# Patient Record
Sex: FEMALE | URBAN - METROPOLITAN AREA
[De-identification: names, ages, dates, MRNs, and addresses within clinical notes are randomized per-mention and may not be internally consistent; named-entity substitution may affect disease eponyms.]

---

## 2022-02-11 ENCOUNTER — INPATIENT (INPATIENT)
Facility: HOSPITAL | Age: 82
LOS: 0 days | Discharge: LEFT BEFORE DISCHARGE | End: 2022-02-11
Attending: HOSPITALIST | Admitting: HOSPITALIST
Payer: MEDICARE

## 2022-02-11 VITALS
DIASTOLIC BLOOD PRESSURE: 87 MMHG | RESPIRATION RATE: 16 BRPM | HEART RATE: 74 BPM | SYSTOLIC BLOOD PRESSURE: 155 MMHG | TEMPERATURE: 98 F

## 2022-02-11 VITALS
HEART RATE: 74 BPM | RESPIRATION RATE: 20 BRPM | DIASTOLIC BLOOD PRESSURE: 76 MMHG | SYSTOLIC BLOOD PRESSURE: 158 MMHG | OXYGEN SATURATION: 100 % | TEMPERATURE: 98 F

## 2022-02-11 DIAGNOSIS — N39.0 URINARY TRACT INFECTION, SITE NOT SPECIFIED: ICD-10-CM

## 2022-02-11 DIAGNOSIS — F32.9 MAJOR DEPRESSIVE DISORDER, SINGLE EPISODE, UNSPECIFIED: ICD-10-CM
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
## 2022-02-11 DIAGNOSIS — Z98.891 HISTORY OF UTERINE SCAR FROM PREVIOUS SURGERY: Chronic | ICD-10-CM

## 2022-02-11 DIAGNOSIS — I10 ESSENTIAL (PRIMARY) HYPERTENSION: ICD-10-CM

## 2022-02-11 DIAGNOSIS — E78.5 HYPERLIPIDEMIA, UNSPECIFIED: ICD-10-CM

## 2022-02-11 DIAGNOSIS — J44.9 CHRONIC OBSTRUCTIVE PULMONARY DISEASE, UNSPECIFIED: ICD-10-CM

## 2022-02-11 DIAGNOSIS — Z29.9 ENCOUNTER FOR PROPHYLACTIC MEASURES, UNSPECIFIED: ICD-10-CM

## 2022-02-11 LAB
ALBUMIN SERPL ELPH-MCNC: 3.7 G/DL — SIGNIFICANT CHANGE UP (ref 3.3–5)
ALP SERPL-CCNC: 82 U/L — SIGNIFICANT CHANGE UP (ref 40–120)
ALT FLD-CCNC: 7 U/L — SIGNIFICANT CHANGE UP (ref 4–33)
ANION GAP SERPL CALC-SCNC: 8 MMOL/L — SIGNIFICANT CHANGE UP (ref 7–14)
APPEARANCE UR: ABNORMAL
APTT BLD: 30.5 SEC — SIGNIFICANT CHANGE UP (ref 27–36.3)
AST SERPL-CCNC: 14 U/L — SIGNIFICANT CHANGE UP (ref 4–32)
BACTERIA # UR AUTO: ABNORMAL
BASOPHILS # BLD AUTO: 0.03 K/UL — SIGNIFICANT CHANGE UP (ref 0–0.2)
BASOPHILS NFR BLD AUTO: 0.5 % — SIGNIFICANT CHANGE UP (ref 0–2)
BILIRUB SERPL-MCNC: 0.4 MG/DL — SIGNIFICANT CHANGE UP (ref 0.2–1.2)
BILIRUB UR-MCNC: NEGATIVE — SIGNIFICANT CHANGE UP
BLD GP AB SCN SERPL QL: POSITIVE — SIGNIFICANT CHANGE UP
BUN SERPL-MCNC: 13 MG/DL — SIGNIFICANT CHANGE UP (ref 7–23)
CALCIUM SERPL-MCNC: 10.1 MG/DL — SIGNIFICANT CHANGE UP (ref 8.4–10.5)
CHLORIDE SERPL-SCNC: 103 MMOL/L — SIGNIFICANT CHANGE UP (ref 98–107)
CO2 SERPL-SCNC: 27 MMOL/L — SIGNIFICANT CHANGE UP (ref 22–31)
COLOR SPEC: SIGNIFICANT CHANGE UP
CREAT SERPL-MCNC: 0.65 MG/DL — SIGNIFICANT CHANGE UP (ref 0.5–1.3)
DIFF PNL FLD: NEGATIVE — SIGNIFICANT CHANGE UP
EOSINOPHIL # BLD AUTO: 0.11 K/UL — SIGNIFICANT CHANGE UP (ref 0–0.5)
EOSINOPHIL NFR BLD AUTO: 2 % — SIGNIFICANT CHANGE UP (ref 0–6)
EPI CELLS # UR: 2 /HPF — SIGNIFICANT CHANGE UP (ref 0–5)
GLUCOSE SERPL-MCNC: 112 MG/DL — HIGH (ref 70–99)
GLUCOSE UR QL: NEGATIVE — SIGNIFICANT CHANGE UP
HCT VFR BLD CALC: 34.4 % — LOW (ref 34.5–45)
HGB BLD-MCNC: 11.2 G/DL — LOW (ref 11.5–15.5)
HYALINE CASTS # UR AUTO: 2 /LPF — SIGNIFICANT CHANGE UP (ref 0–7)
IANC: 3.72 K/UL — SIGNIFICANT CHANGE UP (ref 1.5–8.5)
IMM GRANULOCYTES NFR BLD AUTO: 1.1 % — SIGNIFICANT CHANGE UP (ref 0–1.5)
INR BLD: 1.05 RATIO — SIGNIFICANT CHANGE UP (ref 0.88–1.16)
KETONES UR-MCNC: NEGATIVE — SIGNIFICANT CHANGE UP
LEUKOCYTE ESTERASE UR-ACNC: ABNORMAL
LYMPHOCYTES # BLD AUTO: 1.39 K/UL — SIGNIFICANT CHANGE UP (ref 1–3.3)
LYMPHOCYTES # BLD AUTO: 24.7 % — SIGNIFICANT CHANGE UP (ref 13–44)
MCHC RBC-ENTMCNC: 29.6 PG — SIGNIFICANT CHANGE UP (ref 27–34)
MCHC RBC-ENTMCNC: 32.6 GM/DL — SIGNIFICANT CHANGE UP (ref 32–36)
MCV RBC AUTO: 90.8 FL — SIGNIFICANT CHANGE UP (ref 80–100)
MONOCYTES # BLD AUTO: 0.32 K/UL — SIGNIFICANT CHANGE UP (ref 0–0.9)
MONOCYTES NFR BLD AUTO: 5.7 % — SIGNIFICANT CHANGE UP (ref 2–14)
NEUTROPHILS # BLD AUTO: 3.72 K/UL — SIGNIFICANT CHANGE UP (ref 1.8–7.4)
NEUTROPHILS NFR BLD AUTO: 66 % — SIGNIFICANT CHANGE UP (ref 43–77)
NITRITE UR-MCNC: POSITIVE
NRBC # BLD: 0 /100 WBCS — SIGNIFICANT CHANGE UP
NRBC # FLD: 0 K/UL — SIGNIFICANT CHANGE UP
PH UR: 6 — SIGNIFICANT CHANGE UP (ref 5–8)
PLATELET # BLD AUTO: 114 K/UL — LOW (ref 150–400)
POTASSIUM SERPL-MCNC: 4.1 MMOL/L — SIGNIFICANT CHANGE UP (ref 3.5–5.3)
POTASSIUM SERPL-SCNC: 4.1 MMOL/L — SIGNIFICANT CHANGE UP (ref 3.5–5.3)
PROT SERPL-MCNC: 6.5 G/DL — SIGNIFICANT CHANGE UP (ref 6–8.3)
PROT UR-MCNC: ABNORMAL
PROTHROM AB SERPL-ACNC: 12.1 SEC — SIGNIFICANT CHANGE UP (ref 10.6–13.6)
RBC # BLD: 3.79 M/UL — LOW (ref 3.8–5.2)
RBC # FLD: 12.8 % — SIGNIFICANT CHANGE UP (ref 10.3–14.5)
RBC CASTS # UR COMP ASSIST: 5 /HPF — HIGH (ref 0–4)
SARS-COV-2 RNA SPEC QL NAA+PROBE: SIGNIFICANT CHANGE UP
SODIUM SERPL-SCNC: 138 MMOL/L — SIGNIFICANT CHANGE UP (ref 135–145)
SP GR SPEC: 1.02 — SIGNIFICANT CHANGE UP (ref 1–1.05)
TSH SERPL-MCNC: 2.92 UIU/ML — SIGNIFICANT CHANGE UP (ref 0.27–4.2)
UROBILINOGEN FLD QL: SIGNIFICANT CHANGE UP
WBC # BLD: 5.63 K/UL — SIGNIFICANT CHANGE UP (ref 3.8–10.5)
WBC # FLD AUTO: 5.63 K/UL — SIGNIFICANT CHANGE UP (ref 3.8–10.5)
WBC UR QL: 66 /HPF — HIGH (ref 0–5)

## 2022-02-11 PROCEDURE — 99223 1ST HOSP IP/OBS HIGH 75: CPT

## 2022-02-11 PROCEDURE — 72125 CT NECK SPINE W/O DYE: CPT | Mod: 26,MA

## 2022-02-11 PROCEDURE — 73562 X-RAY EXAM OF KNEE 3: CPT | Mod: 26,LT

## 2022-02-11 PROCEDURE — 99285 EMERGENCY DEPT VISIT HI MDM: CPT

## 2022-02-11 PROCEDURE — 73552 X-RAY EXAM OF FEMUR 2/>: CPT | Mod: 26,LT

## 2022-02-11 PROCEDURE — 73502 X-RAY EXAM HIP UNI 2-3 VIEWS: CPT | Mod: 26,LT

## 2022-02-11 PROCEDURE — 72131 CT LUMBAR SPINE W/O DYE: CPT | Mod: 26,MA

## 2022-02-11 PROCEDURE — 86077 PHYS BLOOD BANK SERV XMATCH: CPT

## 2022-02-11 PROCEDURE — 72128 CT CHEST SPINE W/O DYE: CPT | Mod: 26,MA

## 2022-02-11 PROCEDURE — 70450 CT HEAD/BRAIN W/O DYE: CPT | Mod: 26,MA

## 2022-02-11 RX ORDER — ATORVASTATIN CALCIUM 80 MG/1
80 TABLET, FILM COATED ORAL AT BEDTIME
Refills: 0 | Status: DISCONTINUED | OUTPATIENT
Start: 2022-02-11 | End: 2022-02-11

## 2022-02-11 RX ORDER — CEFTRIAXONE 500 MG/1
1000 INJECTION, POWDER, FOR SOLUTION INTRAMUSCULAR; INTRAVENOUS EVERY 24 HOURS
Refills: 0 | Status: DISCONTINUED | OUTPATIENT
Start: 2022-02-11 | End: 2022-02-11

## 2022-02-11 RX ORDER — ENOXAPARIN SODIUM 100 MG/ML
40 INJECTION SUBCUTANEOUS DAILY
Refills: 0 | Status: DISCONTINUED | OUTPATIENT
Start: 2022-02-11 | End: 2022-02-11

## 2022-02-11 RX ORDER — AMITRIPTYLINE HCL 25 MG
25 TABLET ORAL AT BEDTIME
Refills: 0 | Status: DISCONTINUED | OUTPATIENT
Start: 2022-02-11 | End: 2022-02-11

## 2022-02-11 RX ORDER — CEFTRIAXONE 500 MG/1
1000 INJECTION, POWDER, FOR SOLUTION INTRAMUSCULAR; INTRAVENOUS ONCE
Refills: 0 | Status: COMPLETED | OUTPATIENT
Start: 2022-02-11 | End: 2022-02-11

## 2022-02-11 RX ORDER — ACETAMINOPHEN 500 MG
975 TABLET ORAL ONCE
Refills: 0 | Status: COMPLETED | OUTPATIENT
Start: 2022-02-11 | End: 2022-02-11

## 2022-02-11 RX ORDER — PANTOPRAZOLE SODIUM 20 MG/1
40 TABLET, DELAYED RELEASE ORAL
Refills: 0 | Status: DISCONTINUED | OUTPATIENT
Start: 2022-02-11 | End: 2022-02-11

## 2022-02-11 RX ORDER — LOSARTAN POTASSIUM 100 MG/1
25 TABLET, FILM COATED ORAL DAILY
Refills: 0 | Status: DISCONTINUED | OUTPATIENT
Start: 2022-02-11 | End: 2022-02-11

## 2022-02-11 RX ORDER — AMLODIPINE BESYLATE 2.5 MG/1
2.5 TABLET ORAL DAILY
Refills: 0 | Status: DISCONTINUED | OUTPATIENT
Start: 2022-02-11 | End: 2022-02-11

## 2022-02-11 RX ORDER — ALBUTEROL 90 UG/1
2 AEROSOL, METERED ORAL EVERY 6 HOURS
Refills: 0 | Status: DISCONTINUED | OUTPATIENT
Start: 2022-02-11 | End: 2022-02-11

## 2022-02-11 RX ORDER — ACETAMINOPHEN 500 MG
650 TABLET ORAL EVERY 6 HOURS
Refills: 0 | Status: DISCONTINUED | OUTPATIENT
Start: 2022-02-11 | End: 2022-02-11

## 2022-02-11 RX ORDER — BUDESONIDE AND FORMOTEROL FUMARATE DIHYDRATE 160; 4.5 UG/1; UG/1
2 AEROSOL RESPIRATORY (INHALATION)
Refills: 0 | Status: DISCONTINUED | OUTPATIENT
Start: 2022-02-11 | End: 2022-02-11

## 2022-02-11 RX ORDER — ACETAMINOPHEN 500 MG
2 TABLET ORAL
Qty: 0 | Refills: 0 | DISCHARGE
Start: 2022-02-11

## 2022-02-11 RX ADMIN — CEFTRIAXONE 100 MILLIGRAM(S): 500 INJECTION, POWDER, FOR SOLUTION INTRAMUSCULAR; INTRAVENOUS at 12:49

## 2022-02-11 RX ADMIN — Medication 975 MILLIGRAM(S): at 11:22

## 2022-02-11 RX ADMIN — Medication 975 MILLIGRAM(S): at 12:54

## 2022-02-11 NOTE — H&P ADULT - NSHPPHYSICALEXAM_GEN_ALL_CORE
Vital Signs Last 24 Hrs  T(C): 36.6 (11 Feb 2022 15:42), Max: 36.6 (11 Feb 2022 09:20)  T(F): 97.9 (11 Feb 2022 15:42), Max: 97.9 (11 Feb 2022 12:54)  HR: 66 (11 Feb 2022 15:42) (65 - 74)  BP: 161/95 (11 Feb 2022 15:42) (155/77 - 175/88)  BP(mean): --  RR: 16 (11 Feb 2022 15:42) (16 - 17)  SpO2: 98% (11 Feb 2022 15:42) (98% - 99%)

## 2022-02-11 NOTE — DISCHARGE NOTE PROVIDER - CARE PROVIDER_API CALL
Dr. Rm Sampson,   Primary Care Doctor  [address] [place] [address]  Phone: [phone]  Fax: (   )    -  Follow Up Time:

## 2022-02-11 NOTE — H&P ADULT - ASSESSMENT
80 y/o female, with a PmHx of Vertigo, Depression, COPD, Anemia, HTN, HLD, presented from home to the [facility] ED after having a vertigo causing her to fall down the stairs. Admitted to medicine for social admission and for PT eval.

## 2022-02-11 NOTE — DISCHARGE NOTE PROVIDER - PROVIDER TOKENS
FREE:[LAST:[Dr. Rm Sampson],PHONE:[phone]],FAX:[(   )    -],ADDRESS:[Primary Care Doctor  [address] [place] [address]]]

## 2022-02-11 NOTE — ED PROVIDER NOTE - OBJECTIVE STATEMENT
82 yo F with history of HTN, HLD, Anemia, COPD, vertigo, ?irregular heartbeat coming in after mechanical fall this morning on the stairs. Patient normally ambulates with walker and has aid, was using the stairs then had dizziness, vertigo spell and lost her footing and fell down the stairs. did not lose consciousness, no head trauma, was not able to walk after fall. brought to the ED. At this time patient has lower neck pain, spinal pain, left hip pain, left knee pain. Patient has stress incontinence at baseline however has not lost control of bladder/bowels post fall. has intact saddle sensation. no fevers/chills/chest pain/SOB(different than baseline),N/V/Abdominal pain/ dysuria/diarrhea/leg swelling.     PCP Dr. Rm Sampson

## 2022-02-11 NOTE — H&P ADULT - NEGATIVE MUSCULOSKELETAL SYMPTOMS
Patient Education        Learning About Diabetes Food Guidelines  Your Care Instructions    Meal planning is important to manage diabetes. It helps keep your blood sugar at a target level (which you set with your doctor). You don't have to eat special foods. You can eat what your family eats, including sweets once in a while. But you do have to pay attention to how often you eat and how much you eat of certain foods. You may want to work with a dietitian or a certified diabetes educator (CDE) to help you plan meals and snacks. A dietitian or CDE can also help you lose weight if that is one of your goals. What should you know about eating carbs? Managing the amount of carbohydrate (carbs) you eat is an important part of healthy meals when you have diabetes. Carbohydrate is found in many foods. · Learn which foods have carbs. And learn the amounts of carbs in different foods. ¨ Bread, cereal, pasta, and rice have about 15 grams of carbs in a serving. A serving is 1 slice of bread (1 ounce), ½ cup of cooked cereal, or 1/3 cup of cooked pasta or rice. ¨ Fruits have 15 grams of carbs in a serving. A serving is 1 small fresh fruit, such as an apple or orange; ½ of a banana; ½ cup of cooked or canned fruit; ½ cup of fruit juice; 1 cup of melon or raspberries; or 2 tablespoons of dried fruit. ¨ Milk and no-sugar-added yogurt have 15 grams of carbs in a serving. A serving is 1 cup of milk or 2/3 cup of no-sugar-added yogurt. ¨ Starchy vegetables have 15 grams of carbs in a serving. A serving is ½ cup of mashed potatoes or sweet potato; 1 cup winter squash; ½ of a small baked potato; ½ cup of cooked beans; or ½ cup cooked corn or green peas. · Learn how much carbs to eat each day and at each meal. A dietitian or CDE can teach you how to keep track of the amount of carbs you eat. This is called carbohydrate counting. · If you are not sure how to count carbohydrate grams, use the Plate Method to plan meals.  It is a healthy weight if that is one of your goals. What plan is right for you? Your dietitian or diabetes educator may suggest that you start with the plate format or carbohydrate counting. The plate format  The plate format is a simple way to help you manage how you eat. You plan meals by learning how much space each food should take on a plate. Using the plate format helps you spread carbohydrate throughout the day. It can make it easier to keep your blood sugar level within your target range. It also helps you see if you're eating healthy portion sizes. To use the plate format, you put non-starchy vegetables on half your plate. Add meat or meat substitutes on one-quarter of the plate. Put a grain or starchy vegetable (such as brown rice or a potato) on the final quarter of the plate. You can add a small piece of fruit and some low-fat or fat-free milk or yogurt, depending on your carbohydrate goal for each meal.  Here are some tips for using the plate format:  · Make sure that you are not using an oversized plate. A 9-inch plate is best. Many restaurants use larger plates. · Get used to using the plate format at home. Then you can use it when you eat out. · Write down your questions about using the plate format. Talk to your doctor, a dietitian, or a diabetes educator about your concerns. Carbohydrate counting  With carbohydrate counting, you plan meals based on the amount of carbohydrate in each food. Carbohydrate raises blood sugar higher and more quickly than any other nutrient. It is found in desserts, breads and cereals, and fruit. It's also found in starchy vegetables such as potatoes and corn, grains such as rice and pasta, and milk and yogurt. Spreading carbohydrate throughout the day helps keep your blood sugar levels within your target range.   Your daily amount depends on several things, including your weight, how active you are, which diabetes medicines you take, and what your goals are for your blood too.  · Use cookbooks or online recipes to plan several main meals. Plan some quick meals for busy nights. You also can double some recipes that freeze well. Then you can save half for other busy nights when you don't have time to cook. · Make sure you have the ingredients you need for your recipes. If you're running low on basic items, put these items on your shopping list too. · List foods that you use to make breakfasts, lunches, and snacks. List plenty of fruits and vegetables. · Post this list on the refrigerator. Add to it as you think of more things you need. · Take the list to the store to do your weekly shopping. Follow-up care is a key part of your treatment and safety. Be sure to make and go to all appointments, and call your doctor if you are having problems. It's also a good idea to know your test results and keep a list of the medicines you take. Where can you learn more? Go to [email or web address]://[email or web address]. org and sign in to your [employer] account. Enter P424 in the medidametrics box to learn more about \"Learning About Meal Planning for Diabetes. \"     If you do not have an account, please click on the \"Sign Up Now\" link. Current as of: March 13, 2017  Content Version: 11.5  © 1868-4795 Healthwise, Incorporated. Care instructions adapted under license by [facility] ([facility]). If you have questions about a medical condition or this instruction, always ask your healthcare professional. [place] [address] any warranty or liability for your use of this information. Patient Education        Learning About Type 2 Diabetes  What is type 2 diabetes? Insulin is a hormone that helps your body use sugar from your food as energy. Type 2 diabetes happens when your body can't use insulin the right way. Over time, the pancreas can't make enough insulin. If you don't have enough insulin, too much sugar stays in your blood.   If you are overweight, get little or no exercise, or weight. · Exercising regularly. · Eating healthy foods. How is type 2 diabetes treated? If you have type 2 diabetes, here are the most important things you can do. · Take your diabetes medicines. · Check your blood sugar as often as your doctor recommends. Also, get a hemoglobin A1c test at least every 6 months. · Try to eat a variety of foods and to spread carbohydrate throughout the day. Carbohydrate raises blood sugar higher and more quickly than any other nutrient does. Carbohydrate is found in sugar, breads and cereals, fruit, starchy vegetables such as potatoes and corn, and milk and yogurt. · Get at least 30 minutes of exercise on most days of the week. Walking is a good choice. You also may want to do other activities, such as running, swimming, cycling, or playing tennis or team sports. If your doctor says it's okay, do muscle-strengthening exercises at least 2 times a week. · See your doctor for checkups and tests on a regular schedule. · If you have high blood pressure or high cholesterol, take the medicines as prescribed by your doctor. · Do not smoke. Smoking can make health problems worse. This includes problems you might have with type 2 diabetes. If you need help quitting, talk to your doctor about stop-smoking programs and medicines. These can increase your chances of quitting for good. Follow-up care is a key part of your treatment and safety. Be sure to make and go to all appointments, and call your doctor if you are having problems. It's also a good idea to know your test results and keep a list of the medicines you take. Where can you learn more? Go to https://alexi.[employer]. org and sign in to your [employer] account. Enter E251 in the [id number] box to learn more about \"Learning About Type 2 Diabetes. \"     If you do not have an account, please click on the \"Sign Up Now\" link.   Current as of: March 13, 2017  Content Version: 11.5  © 8562-9689 Healthwise, Incorporated. Care instructions adapted under license by [facility] ([facility]). If you have questions about a medical condition or this instruction, always ask your healthcare professional. [address] any warranty or liability for your use of this information. no stiffness/no neck pain

## 2022-02-11 NOTE — ED PROVIDER NOTE - CLINICAL SUMMARY MEDICAL DECISION MAKING FREE TEXT BOX
82 yo F with history of HTN, HLD, Anemia, COPD, vertigo, ?irregular heartbeat coming in after mechanical fall this morning on the stairs pending labs and imaging workup 82 yo F with history of HTN, HLD, Anemia, COPD, vertigo, ?irregular heartbeat coming in after mechanical fall this morning on the stairs wth no fractures o gross lab abnormalities identified and admitted for fall and pain workup.

## 2022-02-11 NOTE — ED PROVIDER NOTE - NS ED ROS FT
Review of Systems:  Constitutional: No fever, No weight loss, good appetite/po intake  Head: No headache   Eyes: No blurry vision, No diplopia  Neuro: No tremors, No muscle weakness   Cardiovascular: No chest pain, No palpitations  Respiratory: No SOB, No cough  GI: No nausea, No vomiting, No diarrhea  : No dysuria, No hematuria  Skin: No rash  MSK: has joint pain  Psych: No depression

## 2022-02-11 NOTE — H&P ADULT - NSICDXPASTMEDICALHX_GEN_ALL_CORE_FT
PAST MEDICAL HISTORY:  COPD without exacerbation     HLD (hyperlipidemia)     HTN (hypertension)     Major depression     Vertigo

## 2022-02-11 NOTE — DISCHARGE NOTE NURSING/CASE MANAGEMENT/SOCIAL WORK - PATIENT PORTAL LINK FT
You can access the FollowMyHealth Patient Portal offered by [facility] by registering at the following website: [email or web address]. By joining [employer]’s FollowMyHealth portal, you will also be able to view your health information using other applications (apps) compatible with our system.

## 2022-02-11 NOTE — H&P ADULT - ATTENDING COMMENTS
81 F with long standing vertigo who p/w mechanical fall  imaging neg for fractures or bleed  no LOC  CTH with chronic infarcts - neurologically intact  asymptomatic bacteruria - would not tx UA  pt has HHA via insurance - lives on 2nd floor with son living on first  pt adamant about leaving - although no acute medical issues, she hasn't ambulated yet and would need to be seen by PT for safe dispo - also would need to have HHA reinstated - I explained in detail these concerns with pt - pt has capacity and understands and verbalizes back risk of fall/trauma especially with lack of HHA availability -  dw son  should pt decide to leave, needs to be AMA    dw ACP Mark 81 F with long standing vertigo who p/w mechanical fall  imaging neg for fractures or bleed  no LOC  CTH with chronic infarcts - neurologically intact  asymptomatic bacteruria - would not tx UA  pt has HHA via insurance - lives on 2nd floor with son living on first  pt adamant about leaving - although no acute medical issues, she hasn't ambulated yet and would need to be seen by PT for safe dispo - also would need to have HHA reinstated - I explained in detail these concerns with pt - pt has capacity and understands and verbalizes back risk of fall/trauma especially with lack of HHA availability -  ED dw son extensively   should pt decide to leave, needs to be AMA    dw ACP Mark

## 2022-02-11 NOTE — ED PROVIDER NOTE - CARE PLAN
Assessment and plan of treatment:	82 yo F with history of HTN, HLD, Anemia, COPD, vertigo, ?irregular heartbeat coming in after mechanical fall this morning on the stairs pending imaging and standard lab work soon.   Principal Discharge DX:	Fall  Assessment and plan of treatment:	82 yo F with history of HTN, HLD, Anemia, COPD, vertigo, ?irregular heartbeat coming in after mechanical fall this morning on the stairs pending imaging and standard lab work soon.   1

## 2022-02-11 NOTE — ED PROVIDER NOTE - PLAN OF CARE
82 yo F with history of HTN, HLD, Anemia, COPD, vertigo, ?irregular heartbeat coming in after mechanical fall this morning on the stairs pending imaging and standard lab work soon.

## 2022-02-11 NOTE — ED PROVIDER NOTE - PHYSICAL EXAMINATION
PHYSICAL EXAM  GENERAL: NAD,   HEAD:  Atraumatic, Normocephalic  EYES: EOMI b/l, PERRLA b/l, conjunctiva and sclera clear  NECK: Supple, No JVD, No LAD,  CHEST/LUNG: Clear to auscultation bilaterally; No wheeze or ronchi  HEART: Regular rate and rhythm; S1 and S2 present, No murmurs, rubs, or gallops  ABDOMEN: Soft, Nontender, Nondistended; Bowel sounds present  EXTREMITIES:  2+ Peripheral Pulses, No clubbing, cyanosis, or edema  NEURO: AAOx3, non-focal, intact saddle sensation, all exretemities with strength and sensation  SKIN: No rashes or lesions  has lower C spine tenderness, some spine tenderness, has left hip TTP, left knee TTP

## 2022-02-11 NOTE — H&P ADULT - HISTORY OF PRESENT ILLNESS
82 y/o female, with a PmHx of Vertigo, Depression, COPD, Anemia, HTN, HLD, presented from home to the [facility] ED after having a vertigo causing her to fall down the stairs. Pt states at around 0730hrs this morning she was walking down her stairs to let the home health in (has a HHA for about 5 hours per day x 7 days) when she became dizzy (chronic vertigo) causing her to fall down about 5 stairs. Pt states she fell on her butt and then slid down the stairs. Denies any LOC, HA, dizziness, blurred vision, abd pain, n/v. Pt states the home health then called 911. She was c/o of left hip and left knee pain. Pt states she normally walks with a walker. In the [facility] ED, she had a CT Head/Cervical/Thoracic/Lumbar spine that were negative for acute fractures. She had a C-collar on and was cleared by the ED (cervical collar taken off). She also had xrays of her left pelvis/hip/knee and femur that were negative. Currently denies any pain. She was also incidentally found to have a UTI and was given 1 dose of Ceftriaxone but denies any fever, burning on urination or any pelvic pain. She is now being admitted to medicine for further medical evaluation and treatment.    => Of Note: Pt's  recently passed away on 1/9/22; she lives alone but her son and daughter in law live downstairs (mother and daughter)

## 2022-02-11 NOTE — ED PROVIDER NOTE - PROGRESS NOTE DETAILS
spoke to son Korey  updated on course  U A positive for UTI started ceftriaxone still with minimal pain, no acute fractures identified, admitted under Dr. Shahid spoke to patient as she does not want to stay at the hospital much despite her being in continued pain and being unable to walk. Spoke to son over the phone who will talk to his sister and the patient and come to a decision.

## 2022-02-11 NOTE — ED ADULT NURSE NOTE - OBJECTIVE STATEMENT
Pt received to room 20 presents s/p fall down 5 stairs landing on back and hitting head. Pt denies LOC, denies blood thinner use. Pt reports she felt "dizzy" prior to fall, currently endorsing dizziness. Pt a&ox3, ambulatory with walker at baseline, skin intact, respirations even and unlabored. pt denies CP, blurry vision or any other symptoms. pt endorsing hx of COPD, dislocated left hip, CHF. Pt NSR on CM, Resident and MD Hearn at bedside assessing pt. Will await orders and continue to monitor.

## 2022-02-11 NOTE — H&P ADULT - PROBLEM SELECTOR PLAN 2
UA - large leuks, pos nitrites, many bacteria  - no dysuria  - will hold off on abx for now  - UCX testing  - no WBC

## 2022-02-11 NOTE — ED PROVIDER NOTE - NSICDXPASTMEDICALHX_GEN_ALL_CORE_FT
25-Jul-2018 12:56
25-Jul-2018 13:00
26-Jul-2018 15:11
PAST MEDICAL HISTORY:  COPD without exacerbation     HLD (hyperlipidemia)     HTN (hypertension)     Vertigo

## 2022-02-11 NOTE — DISCHARGE NOTE PROVIDER - NSDCMRMEDTOKEN_GEN_ALL_CORE_FT
acetaminophen 325 mg oral tablet: 2 tab(s) orally every 6 hours, As needed, Temp greater or equal to 38C (100.4F), Mild Pain (1 - 3), Moderate Pain (4 - 6)  Albuterol (Eqv-ProAir HFA) 90 mcg/inh inhalation aerosol: 2 puff(s) inhaled every 6 hours, As Needed  amitriptyline 25 mg oral tablet: 1 tab(s) orally once a day (at bedtime)  amLODIPine 2.5 mg oral tablet: 1 tab(s) orally once a day  atorvastatin 80 mg oral tablet: 1 tab(s) orally once a day  losartan 25 mg oral tablet: 1 tab(s) orally once a day  Protonix 40 mg oral delayed release tablet: 1 tab(s) orally once a day  Symbicort 160 mcg-4.5 mcg/inh inhalation aerosol: 2 puff(s) inhaled 2 times a day

## 2022-02-11 NOTE — H&P ADULT - PROBLEM SELECTOR PLAN 1
COVID neg            2/11 CT Head/Cervical/Thoracic/Lumbar Spine  - CT brain: No acute intracranial hemorrhage, brain edema, or mass effect. Chronic bilateral basal ganglia infarcts. No displaced calvarial fracture. CT cervical spine: No acute fracture or traumatic subluxation. No prevertebral soft tissue swelling. Degenerative changes. CT thoracolumbar spine: No acute fracture or traumatic subluxation. Vertebral body height and facet alignment are maintained. Degenerative changes in the lumbar region with significant spinal canal stenosis at L3-L4 and L4-L5.  2/11 Left Femur/Left Knee/Left Hip/Pelvis Xray - No acute fracture or dislocation. Severe degenerative arthritis of the left knee.  - pain control

## 2022-02-11 NOTE — DISCHARGE NOTE PROVIDER - NSDCFUADDAPPT_GEN_ALL_CORE_FT
Follow up with your Primary Care Doctor. You are leaving the hospital against medical advice. You will need to have your home health aides re-established.

## 2022-02-11 NOTE — DISCHARGE NOTE PROVIDER - HOSPITAL COURSE
80 y/o female, with a PmHx of Vertigo, Depression, COPD, Anemia, HTN, HLD, presented from home to the [facility] ED after having a vertigo causing her to fall down the stairs. Pt states at around 0730hrs this morning she was walking down her stairs to let the home health in (has a HHA for about 5 hours per day x 7 days) when she became dizzy (chronic vertigo) causing her to fall down about 5 stairs. Pt states she fell on her butt and then slid down the stairs. Denies any LOC, HA, dizziness, blurred vision, abd pain, n/v. Pt states the home health then called 911. She was c/o of left hip and left knee pain. Pt states she normally walks with a walker. In the [facility] ED, she had a CT Head/Cervical/Thoracic/Lumbar spine that were negative for acute fractures. She had a C-collar on and was cleared by the ED (cervical collar taken off). She also had xrays of her left pelvis/hip/knee and femur that were negative. Currently denies any pain. She was also incidentally found to have a UTI and was given 1 dose of Ceftriaxone but denies any fever, burning on urination or any pelvic pain. She is now being admitted to medicine for further medical evaluation and treatment.    On admission:    1. Fall  COVID neg            2/11 CT Head/Cervical/Thoracic/Lumbar Spine  - CT brain: No acute intracranial hemorrhage, brain edema, or mass effect. Chronic bilateral basal ganglia infarcts. No displaced calvarial fracture. CT cervical spine: No acute fracture or traumatic subluxation. No prevertebral soft tissue swelling. Degenerative changes. CT thoracolumbar spine: No acute fracture or traumatic subluxation. Vertebral body height and facet alignment are maintained. Degenerative changes in the lumbar region with significant spinal canal stenosis at L3-L4 and L4-L5.  2/11 Left Femur/Left Knee/Left Hip/Pelvis Xray - No acute fracture or dislocation. Severe degenerative arthritis of the left knee.  - pain control    2. UTI  UA - large leuks, pos nitrites, many bacteria  - no dysuria  - will hold off on abx for now    3. Vertigo  - chronic, not on meclizine    4. HTN  - monitor bp, cont meds, adjust as needed    5. HLD  - fasting lipid profile, cont statin    6. Depression  - cont Amitriptyline    7. COPD  - non-smoke,  was a smoker  - cont Symbicort and albuterol prn    Pt appears comfortable at this time and is now leaving the hospital Against Medical Advice. Called by nurse to evaluate patient who wishes to leave the hospital against medical advice. Patient is A&O x3 and has full capacity to make his or her own medical decisions. Pt was informed of their medical conditions, benefits, and alternatives to treatment as well as the risks of refusing treatment and the seriousness of leaving against medical advice such as the risks of heart attack, stroke, seizure, and even death were fully explained to the patient.  After expressing full understanding, patient then signs out against medical advice witnessed by the nurse.  Attending made aware.     Spoke to Pt's son Korey, who will come pick her up [phone].

## 2022-02-11 NOTE — ED PROVIDER NOTE - ATTENDING CONTRIBUTION TO CARE
I have personally performed a face to face medical and diagnostic evaluation of the patient. I have discussed with and reviewed the Resident's note and agree with the History, ROS, Physical Exam and MDM unless otherwise indicated. A brief summary of my personal evaluation and impression can be found below.    81F hx of HTN HLD anemia COPD vertigo presents with a cc of back pain and L hip pain s/p non syncopal mechanical fall this morning, no head strike no LOC recalls entire event. Denies numbness, tingling or loss of sensation. Denies loss of motor function. Denies changes in vision. No saddle anaesthesia. Has not been able to walk since fall. No CP SOB or MATA prior to fall, she does report she was feeling dizzy prior to the fall but this dizziness is c/w her prior episodes of dizziness, no other acute component.     All other ROS negative, except as above and as per HPI and ROS section.    VITALS: Initial triage and subsequent vitals have been reviewed by me.  GEN APPEARANCE: WDWN, alert, non-toxic, NAD  HEAD: Atraumatic.  EYES: PERRLa, EOMI, vision grossly intact.   NECK: Supple  CV: RRR, S1S2, no c/r/m/g. Cap refill <2 seconds. No bruits.   LUNGS: CTAB. No abnormal breath sounds.  ABDOMEN: Soft, NTND. No guarding or rebound.   MSK/EXT: C/T/L diffuse ttp+ No CVA ttp. Pelvis stable. L hip, L knee ttp, DNVI. No peripheral edema.  NEURO: Alert, follows commands. Weight bearing normal. Speech normal. Sensation and motor normal x4 extremities. No saddle anesthesia. CN2-12 normal, coordination normal, ambulating normally.  SKIN: Warm, dry and intact. No rash.  PSYCH: Appropriate    Plan/MDM: 81F hx of HTN HLD anemia COPD vertigo presents with a cc of back pain and L hip pain s/p non syncopal mechanical fall this morning, no head strike no LOC recalls entire event. Denies numbness, tingling or loss of sensation. Denies loss of motor function. Denies changes in vision. No saddle anaesthesia. Has not been able to walk since fall. exam vss non toxic PE as above ddx c/ft traumatic injury 2/2 fall will check labs ct xr LE meds as needed and reassess.

## 2022-02-11 NOTE — ED ADULT NURSE REASSESSMENT NOTE - NS ED NURSE REASSESS COMMENT FT1
[facility]  in [facility], being transported over.

## 2022-02-11 NOTE — CHART NOTE - NSCHARTNOTEFT_GEN_A_CORE
Medicine PA Note    Spoke with Dimitris the ED . Case Management had left for the day and patient is leaving the hospital against medical advice. Will be unable to re-establish home health aide. Spoke to son Korey via telephone and he is aware that she is leaving against medical advice and that they will need to re-establish the HHA. Son is coming to the hospital to pick her up.    Mark Salas PA-C  x [phone]

## 2022-02-11 NOTE — DISCHARGE NOTE NURSING/CASE MANAGEMENT/SOCIAL WORK - NSDCPEFALRISK_GEN_ALL_CORE
For information on Fall & Injury Prevention, visit: [email or web address] OR  [email or web address] OR  https://www.cdc.gov/steadi/patient.html

## 2022-02-11 NOTE — DISCHARGE NOTE PROVIDER - NSDCCPCAREPLAN_GEN_ALL_CORE_FT
PRINCIPAL DISCHARGE DIAGNOSIS  Diagnosis: Fall  Assessment and Plan of Treatment: Follow up with your primary care doctor for the Chronic Vertigo that had caused your fall. Take Tylenol as needed for pain control.      SECONDARY DISCHARGE DIAGNOSES  Diagnosis: UTI (urinary tract infection)  Assessment and Plan of Treatment: To be asymptomatic and to prevent reoccurrence.   Always wipe from front to back after using the bathroom. Never wipe twice with the same tissue. Take showers and avoid prolonged baths. Try to empty the bladder at least every 4 hours during the day while awake, even if the need or urge to void is absent.  Do not try to hold your urine until a more convenient time or place.  Drink plenty of water.    Diagnosis: HTN (hypertension)  Assessment and Plan of Treatment: To maintain a normal blood pressure to prevent heart attack, stroke and renal failure.   Low sodium and fat diet, continue anti-hypertensive medications, and follow up with primary care physician.    Diagnosis: HLD (hyperlipidemia)  Assessment and Plan of Treatment: To maintain normal cholesterol levels to prevent stroke, coronary artery disease, peripheral vascular disease and heart attacks.   Low fat diet, exercise daily and continue current medications. Follow up with primary care physician and cardiologist for management.    Diagnosis: Major depression  Assessment and Plan of Treatment: To increase understanding of depressive feelings, and to alleviate depressed mood and return to previous level of normal functioning.   Please follow up with your primary care and psychiatrist within in 1-2 weeks for further medical management.  Call your psychiatrist immediately if you feel suicidal or if you need to talk to someone.  Exercise 30 minutes daily as tolerated.    Diagnosis: GERD (gastroesophageal reflux disease)  Assessment and Plan of Treatment: To prevent acid reflux, heartburn and chest pain.   Avoid fatty, fried foods, acidic foods such as tomatoes, lime and chocolate. Continue to take your medications as prescribed, exercise daily and weight loss.     PRINCIPAL DISCHARGE DIAGNOSIS  Diagnosis: Fall  Assessment and Plan of Treatment: Follow up with your primary care doctor for the Chronic Vertigo that had caused your fall. Take Tylenol as needed for pain control.      SECONDARY DISCHARGE DIAGNOSES  Diagnosis: UTI (urinary tract infection)  Assessment and Plan of Treatment: To be asymptomatic and to prevent reoccurrence.   Always wipe from front to back after using the bathroom. Never wipe twice with the same tissue. Take showers and avoid prolonged baths. Try to empty the bladder at least every 4 hours during the day while awake, even if the need or urge to void is absent.  Do not try to hold your urine until a more convenient time or place.  Drink plenty of water.    Diagnosis: HTN (hypertension)  Assessment and Plan of Treatment: To maintain a normal blood pressure to prevent heart attack, stroke and renal failure.   Low sodium and fat diet, continue anti-hypertensive medications, and follow up with primary care physician.    Diagnosis: COPD without exacerbation  Assessment and Plan of Treatment: To slow down the progression of the disease by quitting smoking and avoiding triggers, such as air pollution.  Continue current medications as prescribed to prevent as shortness of breath, COPD exacerbation and to improve your overall health with regular activity and quality of life.  Follow up routine appointment.   Continue current medications as prescribed. Follow up with your routine physician's appointments.    Diagnosis: HLD (hyperlipidemia)  Assessment and Plan of Treatment: To maintain normal cholesterol levels to prevent stroke, coronary artery disease, peripheral vascular disease and heart attacks.   Low fat diet, exercise daily and continue current medications. Follow up with primary care physician and cardiologist for management.    Diagnosis: Major depression  Assessment and Plan of Treatment: To increase understanding of depressive feelings, and to alleviate depressed mood and return to previous level of normal functioning.   Please follow up with your primary care and psychiatrist within in 1-2 weeks for further medical management.  Call your psychiatrist immediately if you feel suicidal or if you need to talk to someone.  Exercise 30 minutes daily as tolerated.    Diagnosis: GERD (gastroesophageal reflux disease)  Assessment and Plan of Treatment: To prevent acid reflux, heartburn and chest pain.   Avoid fatty, fried foods, acidic foods such as tomatoes, lime and chocolate. Continue to take your medications as prescribed, exercise daily and weight loss.

## 2022-02-11 NOTE — H&P ADULT - NSHPSOCIALHISTORY_GEN_ALL_CORE
Marital Status:     Occupation: Homemaker    Tobacco Use: denies    ETOH Use: denies    Drug Use: denies    Flu Vaccine:  10/2021                          Pneumonia Vaccine:   denies                              COVID Vaccine: J & J 3/2021

## 2022-02-11 NOTE — ED ADULT TRIAGE NOTE - CHIEF COMPLAINT QUOTE
Pt c/o mechanical fall on stairs.  C/O back soreness.  Denies head trauma or LOC.  Amb with assistance on scene.  Hx vertigo.  Was dizzy prior to fall.  No blood thinners.

## 2022-02-11 NOTE — H&P ADULT - NECK DETAILS
mild cervical tenderness, but neg fracture on CT, Full range of motion noted, C-collar cleared by ED/no JVD/normal thyroid gland

## 2022-02-14 LAB
-  AMIKACIN: SIGNIFICANT CHANGE UP
-  AMOXICILLIN/CLAVULANIC ACID: SIGNIFICANT CHANGE UP
-  AMPICILLIN/SULBACTAM: SIGNIFICANT CHANGE UP
-  AMPICILLIN: SIGNIFICANT CHANGE UP
-  AZTREONAM: SIGNIFICANT CHANGE UP
-  CEFAZOLIN: SIGNIFICANT CHANGE UP
-  CEFEPIME: SIGNIFICANT CHANGE UP
-  CEFOXITIN: SIGNIFICANT CHANGE UP
-  CEFTRIAXONE: SIGNIFICANT CHANGE UP
-  CIPROFLOXACIN: SIGNIFICANT CHANGE UP
-  ERTAPENEM: SIGNIFICANT CHANGE UP
-  GENTAMICIN: SIGNIFICANT CHANGE UP
-  IMIPENEM: SIGNIFICANT CHANGE UP
-  LEVOFLOXACIN: SIGNIFICANT CHANGE UP
-  MEROPENEM: SIGNIFICANT CHANGE UP
-  NITROFURANTOIN: SIGNIFICANT CHANGE UP
-  PIPERACILLIN/TAZOBACTAM: SIGNIFICANT CHANGE UP
-  TIGECYCLINE: SIGNIFICANT CHANGE UP
-  TOBRAMYCIN: SIGNIFICANT CHANGE UP
-  TRIMETHOPRIM/SULFAMETHOXAZOLE: SIGNIFICANT CHANGE UP
CULTURE RESULTS: SIGNIFICANT CHANGE UP
METHOD TYPE: SIGNIFICANT CHANGE UP
ORGANISM # SPEC MICROSCOPIC CNT: SIGNIFICANT CHANGE UP
ORGANISM # SPEC MICROSCOPIC CNT: SIGNIFICANT CHANGE UP
RH IG SCN BLD-IMP: POSITIVE — SIGNIFICANT CHANGE UP
SPECIMEN SOURCE: SIGNIFICANT CHANGE UP

## 2023-05-04 NOTE — ED ADULT NURSE NOTE - CHIEF COMPLAINT
The patient is a 81y Female complaining of  Hydroxyzine Pregnancy And Lactation Text: This medication is not safe during pregnancy and should not be taken. It is also excreted in breast milk and breast feeding isn't recommended.

## 2024-01-29 RX ORDER — AMLODIPINE BESYLATE 2.5 MG/1
1 TABLET ORAL
Qty: 0 | Refills: 0 | DISCHARGE

## 2024-01-29 RX ORDER — ATORVASTATIN CALCIUM 80 MG/1
1 TABLET, FILM COATED ORAL
Qty: 0 | Refills: 0 | DISCHARGE

## 2024-01-29 RX ORDER — PANTOPRAZOLE SODIUM 20 MG/1
1 TABLET, DELAYED RELEASE ORAL
Qty: 0 | Refills: 0 | DISCHARGE

## 2024-01-29 RX ORDER — LOSARTAN POTASSIUM 100 MG/1
1 TABLET, FILM COATED ORAL
Qty: 0 | Refills: 0 | DISCHARGE

## 2024-01-29 RX ORDER — ALBUTEROL 90 UG/1
2 AEROSOL, METERED ORAL
Qty: 0 | Refills: 0 | DISCHARGE

## 2024-01-29 RX ORDER — BUDESONIDE AND FORMOTEROL FUMARATE DIHYDRATE 160; 4.5 UG/1; UG/1
2 AEROSOL RESPIRATORY (INHALATION)
Qty: 0 | Refills: 0 | DISCHARGE

## 2024-01-29 RX ORDER — AMITRIPTYLINE HCL 25 MG
1 TABLET ORAL
Qty: 0 | Refills: 0 | DISCHARGE

## 2024-08-28 ENCOUNTER — TRANSCRIPTION ENCOUNTER (OUTPATIENT)
Age: 84
End: 2024-08-28

## 2024-08-28 ENCOUNTER — APPOINTMENT (OUTPATIENT)
Dept: PULMONOLOGY | Facility: CLINIC | Age: 84
End: 2024-08-28
Payer: MEDICARE

## 2024-08-28 PROCEDURE — 99496 TRANSJ CARE MGMT HIGH F2F 7D: CPT

## 2024-08-28 NOTE — ASSESSMENT
[FreeTextEntry1] :  I called patient's pharmacy myself, [place] [facility] in [place] on [address].  I spoke with the pharmacist, he says that actually the insurance issue has been resolved, all the medications are covered.  Only the nebulizer device itself is not and will cost $25.  He said that he has been trying to reach the patient, left a voicemail earlier today. I called back spoke to the daughter and let her know that she should call the pharmacy and arrange for delivery as soon as possible  Nurse practitioner from [facility] visiting tomorrow.  Patient at this point is homebound, she is unable to get out of her home due to stairs.  She will not be able to come into the office for follow-up.

## 2024-08-28 NOTE — PHYSICAL EXAM
[14877 - High Complexity requires an extensive number of possible diagnoses and/or the management options, extensive complexity of the medical data (tests, etc.) to be reviewed, and a high risk of significant complications, morbidity, and/or mortality as w] : High Complexity

## 2024-08-28 NOTE — HISTORY OF PRESENT ILLNESS
[Home] : at home, [unfilled] , at the time of the visit. [Medical Office: ([facility])___] : at the medical office located in  [Family Member] : family member [Verbal consent obtained from patient] : the patient, [unfilled] [Discharged with Nursing Homecare] : Discharged with nursing homecare [[facility]] : Post-hospitalization from [facility] [Chronic obstructive pulmonary disease (COPD)] : Chronic obstructive pulmonary disease (COPD) [Yes] : Yes [Admitted on: ___] : The patient was admitted on [unfilled] [Discharged on ___] : discharged on [unfilled] [Discharge Summary] : discharge summary [Pertinent Labs] : pertinent labs [Radiology Findings] : radiology findings [Discharge Med List] : discharge medication list [Med Reconciliation] : medication reconciliation has been completed [Patient Contacted By: ____] : and contacted by [unfilled] [FreeTextEntry2] : post d/c telehealth visit. pt's daughter present  85 y/o woman, per daughter, carries a dx of COPD related to second hand smoke But has been homebound past 2 years since 's death and stopped going to her doctors or taking medications  She lives alone, has 5 hrs a day of HHA  Pt fell at night at home in June, admitted with a fibular fx, no surgery, soft cast. Went to rehab Per daughter, pt had been sob and wheezing for a while at rehab Admitted 8/24. Normal 02 sat. Neg Cxr. Tx for dx of COPD exacerbation  Came home on 8/26. Uses a walker. No 02. Being followed by [facility]  Per daughter, pt has not yet received any of the d/c meds (Per DC summary, Symbicort, Spiriva, nebulizer, DuoNeb, and prednisone taper. )  Because she said there was an issue with insurance coverage, the pharmacy was in contact with the hospital trying to figure it out, but in the meantime they sent over nothing she reports.  Patient notes shortness of breath with ambulation.  On the video, patient is an elderly woman, obese, and appears quite deconditioned, walking with a walker.  She appears fatigued with walking but no respiratory distress

## 2024-08-29 ENCOUNTER — APPOINTMENT (OUTPATIENT)
Dept: CARE COORDINATION | Facility: HOME HEALTH | Age: 84
End: 2024-08-29

## 2024-08-29 ENCOUNTER — APPOINTMENT (OUTPATIENT)
Age: 84
End: 2024-08-29

## 2024-08-29 VITALS
DIASTOLIC BLOOD PRESSURE: 60 MMHG | RESPIRATION RATE: 20 BRPM | SYSTOLIC BLOOD PRESSURE: 130 MMHG | OXYGEN SATURATION: 98 % | TEMPERATURE: 98.2 F | HEART RATE: 96 BPM

## 2024-08-29 DIAGNOSIS — R52 PAIN, UNSPECIFIED: ICD-10-CM

## 2024-08-29 DIAGNOSIS — J44.1 CHRONIC OBSTRUCTIVE PULMONARY DISEASE WITH (ACUTE) EXACERBATION: ICD-10-CM

## 2024-08-29 DIAGNOSIS — E78.49 OTHER HYPERLIPIDEMIA: ICD-10-CM

## 2024-08-29 DIAGNOSIS — K21.9 GASTRO-ESOPHAGEAL REFLUX DISEASE W/OUT ESOPHAGITIS: ICD-10-CM

## 2024-08-29 DIAGNOSIS — I10 ESSENTIAL (PRIMARY) HYPERTENSION: ICD-10-CM

## 2024-08-29 DIAGNOSIS — R42 DIZZINESS AND GIDDINESS: ICD-10-CM

## 2024-08-29 PROCEDURE — 99349 HOME/RES VST EST MOD MDM 40: CPT

## 2024-08-30 ENCOUNTER — TRANSCRIPTION ENCOUNTER (OUTPATIENT)
Age: 84
End: 2024-08-30

## 2024-08-30 RX ORDER — UMECLIDINIUM 62.5 UG/1
62.5 AEROSOL, POWDER ORAL DAILY
Refills: 0 | Status: ACTIVE | COMMUNITY
Start: 2024-08-30

## 2024-08-30 RX ORDER — PREDNISONE 10 MG/1
10 TABLET ORAL
Refills: 0 | Status: ACTIVE | COMMUNITY
Start: 2024-08-30

## 2024-08-30 RX ORDER — IPRATROPIUM BROMIDE AND ALBUTEROL SULFATE 2.5; .5 MG/3ML; MG/3ML
0.5-2.5 (3) SOLUTION RESPIRATORY (INHALATION) 4 TIMES DAILY
Refills: 0 | Status: ACTIVE | COMMUNITY
Start: 2024-08-30

## 2024-08-30 RX ORDER — ACETAMINOPHEN 325 MG/1
325 TABLET ORAL EVERY 6 HOURS
Refills: 0 | Status: ACTIVE | COMMUNITY
Start: 2024-08-30

## 2024-08-30 RX ORDER — LOSARTAN POTASSIUM 25 MG/1
25 TABLET, FILM COATED ORAL DAILY
Refills: 0 | Status: ACTIVE | COMMUNITY
Start: 2024-08-30

## 2024-08-30 RX ORDER — BUDESONIDE AND FORMOTEROL FUMARATE DIHYDRATE 160; 4.5 UG/1; UG/1
160-4.5 AEROSOL RESPIRATORY (INHALATION) TWICE DAILY
Qty: 1 | Refills: 0 | Status: ACTIVE | COMMUNITY
Start: 2024-08-30 | End: 2024-09-29

## 2024-08-30 RX ORDER — PANTOPRAZOLE 40 MG/1
40 TABLET, DELAYED RELEASE ORAL DAILY
Refills: 0 | Status: ACTIVE | COMMUNITY
Start: 2024-08-30

## 2024-08-30 RX ORDER — ATORVASTATIN CALCIUM 80 MG/1
80 TABLET, FILM COATED ORAL DAILY
Refills: 0 | Status: ACTIVE | COMMUNITY
Start: 2024-08-30

## 2024-08-30 RX ORDER — AMITRIPTYLINE HYDROCHLORIDE 25 MG/1
25 TABLET, FILM COATED ORAL
Refills: 0 | Status: ACTIVE | COMMUNITY
Start: 2024-08-30

## 2024-08-30 RX ORDER — MECLIZINE HCL 50 MG/1
50 TABLET ORAL EVERY 8 HOURS
Refills: 0 | Status: ACTIVE | COMMUNITY
Start: 2024-08-30

## 2024-08-30 NOTE — HEALTH RISK ASSESSMENT
[No] : In the past 12 months have you used drugs other than those required for medical reasons? No [Any fall with injury in past year] : Patient reported fall with injury in the past year [Assistive Device] : Patient uses an assistive device [Little interest or pleasure doing things] : 1) Little interest or pleasure doing things [Feeling down, depressed, or hopeless] : 2) Feeling down, depressed, or hopeless [0] : 2) Feeling down, depressed, or hopeless: Not at all (0) [PHQ-2 Positive] : PHQ-2 Positive [None] : None [With Family] : lives with family [# of Members in Household ___] :  household currently consist of [unfilled] member(s) [Some assistance needed] : walking [Independent] : using telephone [Full assistance needed] : managing finances [With Patient/Caregiver] : , with patient/caregiver [Designated Healthcare Proxy] : Designated healthcare proxy [Name: ___] : Health Care Proxy's Name: [unfilled]  [Relationship: ___] : Relationship: [unfilled] [Aggressive treatment] : aggressive treatment [I will adhere to the patient's wishes.] : I will adhere to the patient's wishes. [Time Spent: ___ minutes] : Time Spent: [unfilled] minutes [Never] : Never [# Of Children ___] : has [unfilled] children [de-identified] : fell on 6/14/24 fx LLE [de-identified] : Rollator, grab bars, commode and PERS [BBD2Gptac] : 0 [Change in mental status noted] : No change in mental status noted [Language] : denies difficulty with language [Behavior] : denies difficulty with behavior [Learning/Retaining New Information] : denies difficulty learning/retaining new information [Handling Complex Tasks] : denies difficulty handling complex tasks [Reasoning] : denies difficulty with reasoning [Spatial Ability and Orientation] : denies difficulty with spatial ability and orientation [de-identified] : Grandson lives with her [AdvancecareDate] : 08/24

## 2024-08-30 NOTE — HEALTH RISK ASSESSMENT
[No] : In the past 12 months have you used drugs other than those required for medical reasons? No [Any fall with injury in past year] : Patient reported fall with injury in the past year [Assistive Device] : Patient uses an assistive device [Little interest or pleasure doing things] : 1) Little interest or pleasure doing things [Feeling down, depressed, or hopeless] : 2) Feeling down, depressed, or hopeless [0] : 2) Feeling down, depressed, or hopeless: Not at all (0) [PHQ-2 Positive] : PHQ-2 Positive [None] : None [With Family] : lives with family [# of Members in Household ___] :  household currently consist of [unfilled] member(s) [Some assistance needed] : walking [Independent] : using telephone [Full assistance needed] : managing finances [With Patient/Caregiver] : , with patient/caregiver [Designated Healthcare Proxy] : Designated healthcare proxy [Name: ___] : Health Care Proxy's Name: [unfilled]  [Relationship: ___] : Relationship: [unfilled] [Aggressive treatment] : aggressive treatment [I will adhere to the patient's wishes.] : I will adhere to the patient's wishes. [Time Spent: ___ minutes] : Time Spent: [unfilled] minutes [Never] : Never [# Of Children ___] : has [unfilled] children [de-identified] : fell on 6/14/24 fx LLE [de-identified] : Rollator, grab bars, commode and PERS [[id number]] : 0 [Change in mental status noted] : No change in mental status noted [Language] : denies difficulty with language [Behavior] : denies difficulty with behavior [Learning/Retaining New Information] : denies difficulty learning/retaining new information [Handling Complex Tasks] : denies difficulty handling complex tasks [Reasoning] : denies difficulty with reasoning [Spatial Ability and Orientation] : denies difficulty with spatial ability and orientation [de-identified] : Grandson lives with her [AdvancecareDate] : 08/24

## 2024-08-30 NOTE — CURRENT MEDS
[Takes medication as prescribed] : takes [None] : Patient does not have any barriers to medication adherence [Yes] : Reviewed medication list for presence of high-risk medications. [Sedatives] : sedatives [FreeTextEntry1] : Daughter will purchase pill box and refills will come as pillpack as per daughter

## 2024-08-30 NOTE — PHYSICAL EXAM
[No Acute Distress] : no acute distress [Well-Appearing] : well-appearing [Normal Rate] : normal rate  [Regular Rhythm] : with a regular rhythm [Normal S1, S2] : normal S1 and S2 [No Extremity Clubbing/Cyanosis] : no extremity clubbing/cyanosis [Normal Affect] : the affect was normal [Alert and Oriented x3] : oriented to person, place, and time [Normal Voice/Communication] : normal voice/communication [Soft] : abdomen soft [Non Tender] : non-tender [Non-distended] : non-distended [Normal Bowel Sounds] : normal bowel sounds [No CVA Tenderness] : no CVA  tenderness [No Spinal Tenderness] : no spinal tenderness [Kyphosis] : kyphosis [de-identified] : insp/exp wheezing all fields with accessory muscles use and increase in RR after walking to bathroom and back [de-identified] : trace BLE edema

## 2024-08-30 NOTE — COUNSELING
[Fall prevention counseling provided] : Fall prevention counseling provided [Adequate lighting] : Adequate lighting [No throw rugs] : No throw rugs [Use proper foot wear] : Use proper foot wear [Use recommended devices] : Use recommended devices [FreeTextEntry1] : education regarding proper safe non slip well fitting foot wear and throw rugs

## 2024-08-30 NOTE — HISTORY OF PRESENT ILLNESS
[FreeTextEntry2] : 83 y/o F hx of COPD, anemia, HTN, HLD, vertigo presents from rehab for evaluation of COPD exacerbation.  Problem/Plan - 1:   Problem: COPD exacerbation.    Plan: 100 percent on room air on high dose steroids and nebulizers plan; ambulate patient start steroid taper start LABA/LAMA.  Problem/Plan - 2:   Problem: HTN (hypertension).    Plan: valsartan.  Problem/Plan - 3:   Problem: Osteoarthritis.    Plan: Tramadol PRN for pain   CC "SOB/MATA" Pt seen today for TCM post hospital discharge for COPD. Pt was in [place] rehab from 6/21/24-8/24/24 for fx LLE. She has been discharged home and will resume [facility] until 9/1/24, she will then be followed by [facility].  Daughter reports she has home visits with RN and NP for next week .She is accompanied by her daughter Dea, son in law and HHA. Pt sitting in chair, she is awake, alert and oriented x 3, she is in no acute distress; breathing is even and unlabored. She denies any chest pain or palpitations. She reports she has SOB/MATA. She was observed walking to the bathroom with rollator and HHA. Upon returning from the bathroom she was tachypneic at RR 24 and positive for insp/exp wheezing all fields. Duo neb given during visit, RR resolved. All present educated on pulmonary PT, deep breathing and to use all inhalers and duo neb Q 6 hours. Medication review done, symbicort not present. Will reach out [facility] Dr [facility]. Daughter states patient does not have PCP, and it is difficult to get her out of the home for MD appointments. Pt lives in 2 family house, upstairs 15 steps. House calls reviewed with daughter, she will wait until she has contact before deciding if she will request house calls. She has HHA 5hrs x 7 days from [employer] [facility] TCM education provided and yellow card left in the home.

## 2024-08-30 NOTE — PHYSICAL EXAM
[No Acute Distress] : no acute distress [Well-Appearing] : well-appearing [Normal Rate] : normal rate  [Regular Rhythm] : with a regular rhythm [Normal S1, S2] : normal S1 and S2 [No Extremity Clubbing/Cyanosis] : no extremity clubbing/cyanosis [Normal Affect] : the affect was normal [Alert and Oriented x3] : oriented to person, place, and time [Normal Voice/Communication] : normal voice/communication [Soft] : abdomen soft [Non Tender] : non-tender [Non-distended] : non-distended [Normal Bowel Sounds] : normal bowel sounds [No CVA Tenderness] : no CVA  tenderness [No Spinal Tenderness] : no spinal tenderness [Kyphosis] : kyphosis [de-identified] : insp/exp wheezing all fields with accessory muscles use and increase in RR after walking to bathroom and back [de-identified] : trace BLE edema

## 2024-08-30 NOTE — HISTORY OF PRESENT ILLNESS
[FreeTextEntry2] : 83 y/o F hx of COPD, anemia, HTN, HLD, vertigo presents from rehab for evaluation of COPD exacerbation.  Problem/Plan - 1:   Problem: COPD exacerbation.    Plan: 100 percent on room air on high dose steroids and nebulizers plan; ambulate patient start steroid taper start LABA/LAMA.  Problem/Plan - 2:   Problem: HTN (hypertension).    Plan: valsartan.  Problem/Plan - 3:   Problem: Osteoarthritis.    Plan: Tramadol PRN for pain   CC "SOB/AMTA" Pt seen today for TCM post hospital discharge for COPD. Pt was in [place] rehab from 6/21/24-8/24/24 for fx LLE. She has been discharged home and will resume [facility] until 9/1/24, she will then be followed by [facility].  Daughter reports she has home visits with RN and NP for next week .She is accompanied by her daughter Dea, son in law and HHA. Pt sitting in chair, she is awake, alert and oriented x 3, she is in no acute distress; breathing is even and unlabored. She denies any chest pain or palpitations. She reports she has SOB/MATA. She was observed walking to the bathroom with rollator and HHA. Upon returning from the bathroom she was tachypneic at RR 24 and positive for insp/exp wheezing all fields. Duo neb given during visit, RR resolved. All present educated on pulmonary PT, deep breathing and to use all inhalers and duo neb Q 6 hours. Medication review done, symbicort not present. Will reach out to Dr [facility] Daughter states patient does not have PCP, and it is difficult to get her out of the home for MD appointments. Pt lives in 2 family house, upstairs 15 steps. House calls reviewed with daughter, she will wait until she has contact before deciding if she will request house calls. She has HHA 5hrs x 7 days from [employer] [facility] TCM education provided and yellow card left in the home.

## 2024-08-30 NOTE — REVIEW OF SYSTEMS
[Fatigue] : fatigue [Lower Ext Edema] : lower extremity edema [Shortness Of Breath] : shortness of breath [Wheezing] : wheezing [Cough] : cough [Dyspnea on Exertion] : dyspnea on exertion [Joint Stiffness] : joint stiffness [Muscle Weakness] : muscle weakness [Dizziness] : dizziness [Negative] : Heme/Lymph [FreeTextEntry2] : reports decrease in appetite

## 2024-09-03 ENCOUNTER — NON-APPOINTMENT (OUTPATIENT)
Age: 84
End: 2024-09-03

## 2024-09-04 ENCOUNTER — TRANSCRIPTION ENCOUNTER (OUTPATIENT)
Age: 84
End: 2024-09-04

## 2024-09-11 ENCOUNTER — TRANSCRIPTION ENCOUNTER (OUTPATIENT)
Age: 84
End: 2024-09-11

## 2024-09-26 ENCOUNTER — TRANSCRIPTION ENCOUNTER (OUTPATIENT)
Age: 84
End: 2024-09-26